# Patient Record
Sex: FEMALE | Race: WHITE | NOT HISPANIC OR LATINO | Employment: FULL TIME | URBAN - METROPOLITAN AREA
[De-identification: names, ages, dates, MRNs, and addresses within clinical notes are randomized per-mention and may not be internally consistent; named-entity substitution may affect disease eponyms.]

---

## 2019-01-14 ENCOUNTER — OFFICE VISIT CONVERTED (OUTPATIENT)
Dept: SURGERY | Facility: CLINIC | Age: 49
End: 2019-01-14
Attending: SURGERY

## 2019-01-25 ENCOUNTER — HOSPITAL ENCOUNTER (OUTPATIENT)
Dept: GASTROENTEROLOGY | Facility: HOSPITAL | Age: 49
Setting detail: HOSPITAL OUTPATIENT SURGERY
Discharge: HOME | End: 2019-01-25
Attending: SURGERY

## 2019-02-19 ENCOUNTER — OFFICE VISIT CONVERTED (OUTPATIENT)
Dept: SURGERY | Facility: CLINIC | Age: 49
End: 2019-02-19
Attending: SURGERY

## 2019-03-04 ENCOUNTER — OFFICE VISIT CONVERTED (OUTPATIENT)
Dept: ORTHOPEDIC SURGERY | Facility: CLINIC | Age: 49
End: 2019-03-04
Attending: ORTHOPAEDIC SURGERY

## 2019-03-13 ENCOUNTER — OFFICE VISIT CONVERTED (OUTPATIENT)
Dept: ORTHOPEDIC SURGERY | Facility: CLINIC | Age: 49
End: 2019-03-13
Attending: ORTHOPAEDIC SURGERY

## 2019-03-13 ENCOUNTER — CONVERSION ENCOUNTER (OUTPATIENT)
Dept: ORTHOPEDIC SURGERY | Facility: CLINIC | Age: 49
End: 2019-03-13

## 2019-08-01 ENCOUNTER — HOSPITAL ENCOUNTER (OUTPATIENT)
Dept: PERIOP | Facility: HOSPITAL | Age: 49
Setting detail: HOSPITAL OUTPATIENT SURGERY
Discharge: HOME | End: 2019-08-01
Attending: ORTHOPAEDIC SURGERY

## 2019-08-14 ENCOUNTER — OFFICE VISIT CONVERTED (OUTPATIENT)
Dept: ORTHOPEDIC SURGERY | Facility: CLINIC | Age: 49
End: 2019-08-14
Attending: PHYSICIAN ASSISTANT

## 2019-09-18 ENCOUNTER — OFFICE VISIT CONVERTED (OUTPATIENT)
Dept: ORTHOPEDIC SURGERY | Facility: CLINIC | Age: 49
End: 2019-09-18
Attending: PHYSICIAN ASSISTANT

## 2021-03-30 ENCOUNTER — OFFICE VISIT CONVERTED (OUTPATIENT)
Dept: FAMILY MEDICINE CLINIC | Facility: CLINIC | Age: 51
End: 2021-03-30
Attending: PHYSICIAN ASSISTANT

## 2021-03-30 ENCOUNTER — CONVERSION ENCOUNTER (OUTPATIENT)
Dept: FAMILY MEDICINE CLINIC | Facility: CLINIC | Age: 51
End: 2021-03-30

## 2021-05-10 NOTE — H&P
History and Physical      Patient Name: Idalia Drew   Patient ID: [id number]   Sex: Female   YOB: 1970    Primary Care Provider: Sarkis Pete PA-C   Referring Provider: Connor Jara MD    Visit Date: March 30, 2021    Provider: Sarkis Pete PA-C   Location: [facility] [place]   Location Address: [address] [place] [address], [address]  [place], [place]  [id number]   Location Phone: [phone]          Chief Complaint  · New Pt/Establish Care      History Of Present Illness  Idalia Drew is a 50 year old /White female who presents for evaluation and treatment of: New Pt/Establish Care      Pt presents today to establish care. Pt's previous PCP was Dr. Ronn Jara.     Pt is requesting refills on medications, she is on Adderall, Klonopin and Neurontin.     PMH- Breast Cancer- sees Dr. Noble once a year. ADD- takes Adderall 10mg 1/2 tab bid. Anxiety-controlled with Klonopin, pt is unsure of what dose she takes. Chemo induced neuropathy- controlled with Neurontin.     Pt has received both doses of COVID-19 vaccine.     Labs-2019  Pap-Dr. Hamilton 2019  Flu- refused  Colonoscopy-2019 Dr. Cuevas    Pt had hx of breast cancer - Stage IIIB - Triple Negative  2008 - [facility] - Dr. Ybarra  Right breast - 10 lymph node positive         Past Medical History  Disease Name Date Onset Notes   Anxiety --  --    Arthritis --  --    Asthma --  --    Bladder disorder --  --    Breast cancer --  --    Broken Bones --  --    Cancer --  --    Chemotherapy management, encounter for --  --    Diverticulitis 2019 --    Forgetfulness --  --    Gall Stones --  --    GERD --  --    Heart Murmur --  --    Limb Pain --  --    Limb Swelling --  --    Night sweat --  --    Psoriasis --  --    Reflux --  --    Reflux Disease --  --    Seasonal allergies --  --    Shortness of Breath --  --          Past Surgical History  Procedure Name Date Notes   Breast --  --    Cesarian Section 1989, 1993 --     Colonoscopy --  --    Gallbladder --  --    Mastectomy, bilateral 2008 --    Port Placement --  --          Medication List  Name Date Started Instructions   Adderall oral  take by oral route daily   Klonopin oral  take by oral route As needed   Neurontin 600 mg oral tablet  take 1 tablet (600 mg) by oral route 3 times per day         Allergy List  Allergen Name Date Reaction Notes   Bactrim --  --  --    Latex Exam Gloves --  --  --    SULFA (SULFONAMIDES) --  --  --          Family Medical History  Disease Name Relative/Age Notes   Stroke Mother/   Mother   Alzheimer's Disease Father/   --    Heart Disease Father/   --    Lymphoma, Malignant Father/   --    Lung cancer Aunt/   --    Family history of breast cancer Aunt/   Aunt/30s   Diabetes  --    Osteoporosis Mother/  Sister/   Mother; Sister   Family history of Arthritis Father/  Mother/  Sister/   Mother; Father; Sister         Social History  Finding Status Start/Stop Quantity Notes   Alcohol Current some day --/-- --  drinks weekly; wine. Pt reports 10 glasses of wine per week.    Caffeine Current every day --/-- --  drinks regularly; coffee; 3-4 times per day   lives with spouse --  --/-- --  --    . --  --/-- --  --    Recreational Drug Use Never --/-- --  no   Tobacco Former --/-- --  former smoker  former smoker; started smoking at age 16; quit smoking at age 23; smoked 11 cigarette(s) per day   Working --  --/-- --  --          Review of Systems  · Constitutional  o Denies  o : fever, fatigue, weight loss, weight gain  · Cardiovascular  o Denies  o : lower extremity edema, claudication, chest pressure, palpitations  · Respiratory  o Denies  o : shortness of breath, wheezing, cough, hemoptysis, dyspnea on exertion  · Gastrointestinal  o Denies  o : nausea, vomiting, diarrhea, constipation, abdominal pain      Vitals  Date Time BP Position Site L\R Cuff Size HR RR TEMP (F) WT  HT  BMI kg/m2 BSA m2 O2 Sat FR L/min FiO2 HC       03/30/2021 10:10  "/62 Sitting    66 - R   156lbs 12.8oz 5'  4\" 26.91 1.79 98 %            Physical Examination  · Constitutional  o Appearance  o : well developed, well-nourished, no acute distress  · Head and Face  o Head  o : normocephalic, atraumatic  · Ears, Nose, Mouth and Throat  o Ears  o :   § External Ears  § : external auditory canal appearance normal, no discharge present  § Otoscopic Examination  § : tympanic membranes pearly white/gray bilaterally  o Nose  o :   § External Nose  § : no lesions noted  § Nasopharynx  § : no discharge present  o Oral Cavity  o :   § Oral Mucosa  § : oral mucosa light pink  o Throat  o :   § Oropharynx  § : tonsils without exudate, no palatal petechiae  · Neck  o Inspection/Palpation  o : normal appearance, no masses or tenderness, trachea midline  o Thyroid  o : gland size normal, nontender, no nodules or masses present on palpation  · Respiratory  o Respiratory Effort  o : breathing unlabored  o Inspection of Chest  o : chest rise symmetric bilaterally  o Auscultation of Lungs  o : clear to auscultation bilaterally throughout inspiration and expiration  · Cardiovascular  o Heart  o :   § Auscultation of Heart  § : regular rate and rhythm, no murmurs, gallops or rubs  o Peripheral Vascular System  o :   § Extremities  § : no edema  · Lymphatic  o Neck  o : no cervical lymphadenopathy, no supraclavicular lymphadenopathy  · Psychiatric  o Mood and Affect  o : mood normal, affect appropriate          Assessment  · Anxiety disorder     300.00/F41.9  · Screening for depression     V79.0/Z13.89  · Need for influenza vaccination     V04.81/Z23  · GERD     530.81  · Breast cancer     174.9/C50.919  Triple Negative Stage IIIB  · Neuropathy     355.9/G62.9  · ADD (attention deficit disorder)     314.00/F98.8  · Neuropathy associated with cancer       Malignant (primary) neoplasm, unspecified     199.1/C80.1  Polyneuropathy in diseases classified " elsewhere     199.1/G63      Plan  · Orders  o ACO-18: Positive screen for clinical depression using a standardized tool and a follow-up plan documented () - V79.0/Z13.89 - 03/30/2021  o ACO-14: Influenza immunization was not administered for reasons documented () - V04.81/Z23 - 03/30/2021   pt refused  o Free T4 (59571) - - 03/30/2021  o Physical, Primary Care Panel (CBC, CMP, Lipid, TSH) [facility] (42346, 17896, 75455, 87424) - - 03/30/2021  o Urinalysis with Reflex Microscopy ([facility]) (05963) - - 03/30/2021  o Vitamin D (25-Hydroxy) Level (11178) - - 03/30/2021  o ORTIZ Report ([facility]) - - 03/30/2021  o ACO-39: Current medications updated and reviewed (1159F, ) - - 03/30/2021  o Urine Drug Screen ([facility]) (85684) - - 03/30/2021  · Medications  o Strattera 40 mg oral capsule   SIG: take 2 capsules (80 mg) by oral route once daily in the morning   DISP: (60) Capsule with 2 refills  Prescribed on 03/30/2021     o Neurontin 600 mg oral tablet   SIG: take 1 tablet (600 mg) by oral route 3 times per day for 30 days   DISP: (90) Tablet with 5 refills  Adjusted on 03/30/2021     o Adderall oral   SIG: take by oral route daily   DISP: (0) Tablet with 0 refills  Discontinued on 03/30/2021     o Medications have been Reconciled  o Transition of Care or Provider Policy  · Instructions  o Depression Screen completed and scanned into the EMR under the designated folder within the patient's documents.  o Today's PHQ-9 result is _17__  o Flu vaccine declined.  o Obtained a written consent for ORTIZ query. Discussed the risk and benefits of the use of controlled substances with the patient, including the risk of tolerance and drug dependence. The patient has been counseled on the need to have an exit strategy, including potentially discontinuing the use of controlled substances. ORTIZ has or will be reviewed as soon as it becomes avaliable.  o See note for indication of controlled substance. [place]iz and drug screen have been  reviewed. Controlled substance agreement signed and scanned into chart. After discussion of risks and benefits of medication, I have determined patient is suitable for Rx while demonstrating the ability to safely follow and administer the medication plan. Patient understands the expectation that medication directions cannot be adjusted without a provider's written approval.   o Take all medications as prescribed/directed.  o Patient instructed/educated on their diet and exercise program.  o Patient was educated/instructed on their diagnosis, treatment and medications prior to discharge from the clinic today.  o Flu vaccine declined.  o Discussed Covid-19 precautions including, but not limited to, social distancing, avoid touching your face, and hand washing.   · Disposition  o Call or Return if symptoms worsen or persist.  o F/U in clinic in 1 month.  o Care Transition            Electronically Signed by: Sarkis Pete PA-C -Author on March 31, 2021 04:43:07 PM

## 2021-05-14 VITALS
BODY MASS INDEX: 26.76 KG/M2 | DIASTOLIC BLOOD PRESSURE: 62 MMHG | WEIGHT: 156.75 LBS | HEART RATE: 66 BPM | HEIGHT: 64 IN | OXYGEN SATURATION: 98 % | SYSTOLIC BLOOD PRESSURE: 126 MMHG

## 2021-05-15 VITALS — BODY MASS INDEX: 28.04 KG/M2 | OXYGEN SATURATION: 99 % | HEART RATE: 80 BPM | HEIGHT: 64 IN | WEIGHT: 164.25 LBS

## 2021-05-15 VITALS — HEART RATE: 75 BPM | HEIGHT: 64 IN | BODY MASS INDEX: 27.38 KG/M2 | OXYGEN SATURATION: 96 % | WEIGHT: 160.37 LBS

## 2021-05-16 VITALS — HEIGHT: 64 IN | WEIGHT: 169 LBS | BODY MASS INDEX: 28.85 KG/M2

## 2021-05-16 VITALS — HEIGHT: 64 IN | WEIGHT: 169 LBS | OXYGEN SATURATION: 98 % | BODY MASS INDEX: 28.85 KG/M2 | HEART RATE: 76 BPM

## 2021-05-16 VITALS — WEIGHT: 175.31 LBS | HEIGHT: 64 IN | RESPIRATION RATE: 14 BRPM | BODY MASS INDEX: 29.93 KG/M2

## 2021-05-16 VITALS — RESPIRATION RATE: 14 BRPM | WEIGHT: 175 LBS | HEIGHT: 64 IN | BODY MASS INDEX: 29.88 KG/M2

## 2021-07-02 ENCOUNTER — OFFICE VISIT (OUTPATIENT)
Dept: FAMILY MEDICINE CLINIC | Facility: CLINIC | Age: 51
End: 2021-07-02

## 2021-07-02 VITALS
HEART RATE: 69 BPM | BODY MASS INDEX: 28.35 KG/M2 | OXYGEN SATURATION: 99 % | DIASTOLIC BLOOD PRESSURE: 66 MMHG | HEIGHT: 63 IN | WEIGHT: 160 LBS | SYSTOLIC BLOOD PRESSURE: 120 MMHG

## 2021-07-02 DIAGNOSIS — Z51.11 CHEMOTHERAPY MANAGEMENT, ENCOUNTER FOR: ICD-10-CM

## 2021-07-02 DIAGNOSIS — F32.A DEPRESSION, UNSPECIFIED DEPRESSION TYPE: ICD-10-CM

## 2021-07-02 DIAGNOSIS — C50.919 MALIGNANT NEOPLASM OF FEMALE BREAST, UNSPECIFIED ESTROGEN RECEPTOR STATUS, UNSPECIFIED LATERALITY, UNSPECIFIED SITE OF BREAST (HCC): Primary | ICD-10-CM

## 2021-07-02 DIAGNOSIS — G62.9 NEUROPATHY: ICD-10-CM

## 2021-07-02 DIAGNOSIS — F41.9 ANXIETY DISORDER, UNSPECIFIED TYPE: ICD-10-CM

## 2021-07-02 PROBLEM — R01.1 HEART MURMUR: Status: ACTIVE | Noted: 2021-07-02

## 2021-07-02 PROBLEM — M79.89 LIMB SWELLING: Status: ACTIVE | Noted: 2021-07-02

## 2021-07-02 PROBLEM — L40.9 PSORIASIS: Status: ACTIVE | Noted: 2021-07-02

## 2021-07-02 PROBLEM — K57.92 DIVERTICULITIS: Status: ACTIVE | Noted: 2021-07-02

## 2021-07-02 PROBLEM — J30.2 SEASONAL ALLERGIC RHINITIS: Status: ACTIVE | Noted: 2021-07-02

## 2021-07-02 PROBLEM — K21.9 ESOPHAGEAL REFLUX: Status: ACTIVE | Noted: 2021-07-02

## 2021-07-02 PROBLEM — J45.909 ASTHMA: Status: ACTIVE | Noted: 2021-07-02

## 2021-07-02 PROCEDURE — 99214 OFFICE O/P EST MOD 30 MIN: CPT | Performed by: PHYSICIAN ASSISTANT

## 2021-07-02 PROCEDURE — 80305 DRUG TEST PRSMV DIR OPT OBS: CPT | Performed by: PHYSICIAN ASSISTANT

## 2021-07-02 RX ORDER — GABAPENTIN 600 MG/1
600 TABLET ORAL 3 TIMES DAILY
COMMUNITY
End: 2021-07-02 | Stop reason: SDUPTHER

## 2021-07-02 RX ORDER — CLONAZEPAM 0.5 MG/1
TABLET ORAL
COMMUNITY
Start: 2021-03-31 | End: 2021-07-02 | Stop reason: SDUPTHER

## 2021-07-02 RX ORDER — CLONAZEPAM 0.5 MG/1
0.5 TABLET ORAL 2 TIMES DAILY PRN
Qty: 60 TABLET | Refills: 0 | Status: SHIPPED | OUTPATIENT
Start: 2021-07-02 | End: 2021-08-05

## 2021-07-02 RX ORDER — FLUOXETINE HYDROCHLORIDE 20 MG/1
20 CAPSULE ORAL DAILY
Qty: 30 CAPSULE | Refills: 2 | Status: SHIPPED | OUTPATIENT
Start: 2021-07-02 | End: 2021-10-18

## 2021-07-02 RX ORDER — ATOMOXETINE 80 MG/1
CAPSULE ORAL
COMMUNITY
Start: 2021-03-31 | End: 2021-07-02

## 2021-07-02 RX ORDER — GABAPENTIN 600 MG/1
600 TABLET ORAL 3 TIMES DAILY
Qty: 90 TABLET | Refills: 2 | Status: SHIPPED | OUTPATIENT
Start: 2021-07-02 | End: 2021-10-18 | Stop reason: SDUPTHER

## 2021-07-02 NOTE — PROGRESS NOTES
"Chief Complaint  Anxiety (3 month follow up) and Heartburn    Subjective          Idalia Drew presents to [facility]  History of Present Illness  Pt presents today for 3 month follow up.    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  Survivors guilt - she is crying during exam today.  She did not have labs done that were ordered.    She lost her nephew and her brother from MVA and cancer respectively.  No SI/HI    Objective   Vital Signs:   /66 (BP Location: Left arm)   Pulse 69   Ht 160 cm (63\")   Wt 72.6 kg (160 lb)   SpO2 99%   BMI 28.34 kg/m²     Physical Exam  Vitals and nursing note reviewed.   Constitutional:       Appearance: Normal appearance.   HENT:      Head: Normocephalic and atraumatic.   Cardiovascular:      Rate and Rhythm: Normal rate and regular rhythm.   Pulmonary:      Effort: Pulmonary effort is normal.      Breath sounds: Normal breath sounds.   Musculoskeletal:      Cervical back: Neck supple.   Neurological:      Mental Status: She is alert.   Psychiatric:         Mood and Affect: Mood normal.         Behavior: Behavior normal.      Crying during exam    Result Review :                       Assessment and Plan    Diagnoses and all orders for this visit:    1. Malignant neoplasm of female breast, unspecified estrogen receptor status, unspecified laterality, unspecified site of breast (CMS/HCC) (Primary)  -     CBC & Differential; Future  -     Comprehensive Metabolic Panel; Future  -     Lipid Panel; Future  -     TSH Rfx On Abnormal To Free T4; Future  -     Vitamin D 25 Hydroxy; Future  -     Urinalysis With Microscopic If Indicated (No Culture) - Urine, Clean Catch; Future  -     Vitamin B12 & Folate; Future    2. Neuropathy  -     CBC & " Differential; Future  -     Comprehensive Metabolic Panel; Future  -     Lipid Panel; Future  -     TSH Rfx On Abnormal To Free T4; Future  -     Vitamin D 25 Hydroxy; Future  -     Urinalysis With Microscopic If Indicated (No Culture) - Urine, Clean Catch; Future  -     Vitamin B12 & Folate; Future  -     gabapentin (NEURONTIN) 600 MG tablet; Take 1 tablet by mouth 3 (Three) Times a Day.  Dispense: 90 tablet; Refill: 2    3. Chemotherapy management, encounter for  -     CBC & Differential; Future  -     Comprehensive Metabolic Panel; Future  -     Lipid Panel; Future  -     TSH Rfx On Abnormal To Free T4; Future  -     Vitamin D 25 Hydroxy; Future  -     Urinalysis With Microscopic If Indicated (No Culture) - Urine, Clean Catch; Future  -     Vitamin B12 & Folate; Future  -     gabapentin (NEURONTIN) 600 MG tablet; Take 1 tablet by mouth 3 (Three) Times a Day.  Dispense: 90 tablet; Refill: 2    4. Anxiety disorder, unspecified type  -     FLUoxetine (PROzac) 20 MG capsule; Take 1 capsule by mouth Daily.  Dispense: 30 capsule; Refill: 2  -     clonazePAM (KlonoPIN) 0.5 MG tablet; Take 1 tablet by mouth 2 (Two) Times a Day As Needed for Anxiety.  Dispense: 60 tablet; Refill: 0    5. Depression, unspecified depression type  -     FLUoxetine (PROzac) 20 MG capsule; Take 1 capsule by mouth Daily.  Dispense: 30 capsule; Refill: 2  -     clonazePAM (KlonoPIN) 0.5 MG tablet; Take 1 tablet by mouth 2 (Two) Times a Day As Needed for Anxiety.  Dispense: 60 tablet; Refill: 0       Pt is willing to try SSRI.    Follow Up   Return in about 3 months (around 10/2/2021).  Patient was given instructions and counseling regarding her condition or for health maintenance advice. Please see specific information pulled into the AVS if appropriate.     SHEREE Galvin

## 2021-08-05 DIAGNOSIS — F32.A DEPRESSION, UNSPECIFIED DEPRESSION TYPE: ICD-10-CM

## 2021-08-05 DIAGNOSIS — F41.9 ANXIETY DISORDER, UNSPECIFIED TYPE: ICD-10-CM

## 2021-08-05 RX ORDER — CLONAZEPAM 0.5 MG/1
TABLET ORAL
Qty: 60 TABLET | Refills: 0 | Status: SHIPPED | OUTPATIENT
Start: 2021-08-05 | End: 2021-10-18 | Stop reason: SDUPTHER

## 2021-10-18 ENCOUNTER — OFFICE VISIT (OUTPATIENT)
Dept: FAMILY MEDICINE CLINIC | Facility: CLINIC | Age: 51
End: 2021-10-18

## 2021-10-18 VITALS
SYSTOLIC BLOOD PRESSURE: 120 MMHG | HEART RATE: 66 BPM | WEIGHT: 167 LBS | DIASTOLIC BLOOD PRESSURE: 63 MMHG | HEIGHT: 63 IN | OXYGEN SATURATION: 98 % | BODY MASS INDEX: 29.59 KG/M2

## 2021-10-18 DIAGNOSIS — Z51.11 CHEMOTHERAPY MANAGEMENT, ENCOUNTER FOR: ICD-10-CM

## 2021-10-18 DIAGNOSIS — F32.A DEPRESSION, UNSPECIFIED DEPRESSION TYPE: ICD-10-CM

## 2021-10-18 DIAGNOSIS — G62.9 NEUROPATHY: Chronic | ICD-10-CM

## 2021-10-18 DIAGNOSIS — E53.8 VITAMIN B12 DEFICIENCY: Chronic | ICD-10-CM

## 2021-10-18 DIAGNOSIS — F41.9 ANXIETY DISORDER, UNSPECIFIED TYPE: Primary | ICD-10-CM

## 2021-10-18 PROCEDURE — 99214 OFFICE O/P EST MOD 30 MIN: CPT | Performed by: PHYSICIAN ASSISTANT

## 2021-10-18 RX ORDER — CYANOCOBALAMIN 1000 UG/ML
1000 INJECTION, SOLUTION INTRAMUSCULAR; SUBCUTANEOUS
Qty: 3 ML | Refills: 3 | Status: SHIPPED | OUTPATIENT
Start: 2021-10-18 | End: 2022-06-03 | Stop reason: SDUPTHER

## 2021-10-18 RX ORDER — CLONAZEPAM 0.5 MG/1
0.5 TABLET ORAL 2 TIMES DAILY PRN
Qty: 60 TABLET | Refills: 0 | Status: SHIPPED | OUTPATIENT
Start: 2021-10-18 | End: 2022-01-25 | Stop reason: SDUPTHER

## 2021-10-18 RX ORDER — VORTIOXETINE 10 MG/1
10 TABLET, FILM COATED ORAL DAILY
Qty: 90 TABLET | Refills: 1 | Status: SHIPPED | OUTPATIENT
Start: 2021-10-18 | End: 2022-01-25

## 2021-10-18 RX ORDER — GABAPENTIN 600 MG/1
600 TABLET ORAL 3 TIMES DAILY
Qty: 90 TABLET | Refills: 3 | Status: SHIPPED | OUTPATIENT
Start: 2021-10-18 | End: 2022-01-25 | Stop reason: SDUPTHER

## 2021-10-18 RX ORDER — CYANOCOBALAMIN 1000 UG/ML
1000 INJECTION, SOLUTION INTRAMUSCULAR; SUBCUTANEOUS
Status: SHIPPED | OUTPATIENT
Start: 2021-10-18

## 2021-10-18 NOTE — PROGRESS NOTES
"Chief Complaint  Anxiety (3 month follow up) and Depression    Subjective          Idalia Drew presents to [facility]  History of Present Illness    Idalia Drew is a 51 y.o. female who presents today for a 3 month follow up Anxiety, depression.     Pt notes she is doing well on Prozac, however she has gained 7lbs in 3 months. Pt is concerned about weight gain and stated it has made it hard on her knees.     Labs-5/2020  Colonoscopy-1/2019  UDS-7/2021  Farzad-7/2/21    Pt refuses flu vaccine.   Pt refuses pneumo shot      Current Outpatient Medications:   •  clonazePAM (KlonoPIN) 0.5 MG tablet, Take 1 tablet by mouth 2 (Two) Times a Day As Needed for Anxiety., Disp: 60 tablet, Rfl: 0  •  gabapentin (NEURONTIN) 600 MG tablet, Take 1 tablet by mouth 3 (Three) Times a Day., Disp: 90 tablet, Rfl: 3  •  cyanocobalamin 1000 MCG/ML injection, Inject 1 mL into the appropriate muscle as directed by prescriber Every 28 (Twenty-Eight) Days., Disp: 3 mL, Rfl: 3  •  Vortioxetine HBr (Trintellix) 10 MG tablet, Take 10 mg by mouth Daily., Disp: 90 tablet, Rfl: 1    Current Facility-Administered Medications:   •  cyanocobalamin injection 1,000 mcg, 1,000 mcg, Intramuscular, Q28 Days, Ivan Pete PA    Objective      Vital Signs:   /63 (BP Location: Left arm)   Pulse 66   Ht 160 cm (63\")   Wt 75.8 kg (167 lb)   SpO2 98%   BMI 29.58 kg/m²    Estimated body mass index is 29.58 kg/m² as calculated from the following:    Height as of this encounter: 160 cm (63\").    Weight as of this encounter: 75.8 kg (167 lb).     Physical Exam  Vitals and nursing note reviewed.   Constitutional:       Appearance: Normal appearance.   HENT:      Head: Normocephalic and atraumatic.   Cardiovascular:      Rate and Rhythm: Normal rate and regular rhythm.      Heart sounds: Murmur heard.       Pulmonary:      Effort: Pulmonary effort is normal.      Breath sounds: Normal breath sounds.   Musculoskeletal: "      Cervical back: Neck supple.   Neurological:      Mental Status: She is alert.   Psychiatric:         Mood and Affect: Mood normal.         Behavior: Behavior normal.        Result Review :                       Assessment and Plan      Diagnoses and all orders for this visit:    1. Anxiety disorder, unspecified type (Primary)  -     Vortioxetine HBr (Trintellix) 10 MG tablet; Take 10 mg by mouth Daily.  Dispense: 90 tablet; Refill: 1  -     clonazePAM (KlonoPIN) 0.5 MG tablet; Take 1 tablet by mouth 2 (Two) Times a Day As Needed for Anxiety.  Dispense: 60 tablet; Refill: 0    2. Depression, unspecified depression type  -     Vortioxetine HBr (Trintellix) 10 MG tablet; Take 10 mg by mouth Daily.  Dispense: 90 tablet; Refill: 1  -     clonazePAM (KlonoPIN) 0.5 MG tablet; Take 1 tablet by mouth 2 (Two) Times a Day As Needed for Anxiety.  Dispense: 60 tablet; Refill: 0    3. Neuropathy  Comments:  Stable on neurontin 600mg TID  Orders:  -     gabapentin (NEURONTIN) 600 MG tablet; Take 1 tablet by mouth 3 (Three) Times a Day.  Dispense: 90 tablet; Refill: 3    4. Chemotherapy management, encounter for  -     gabapentin (NEURONTIN) 600 MG tablet; Take 1 tablet by mouth 3 (Three) Times a Day.  Dispense: 90 tablet; Refill: 3    5. Vitamin B12 deficiency  Comments:  Stable on B12 injections monthly  Orders:  -     cyanocobalamin injection 1,000 mcg  -     cyanocobalamin 1000 MCG/ML injection; Inject 1 mL into the appropriate muscle as directed by prescriber Every 28 (Twenty-Eight) Days.  Dispense: 3 mL; Refill: 3       Follow Up     Return in about 4 months (around 2/18/2022).    I have reviewed information obtained and documented by others and I have confirmed the accuracy of this documented note.     Patient was given instructions and counseling regarding her condition or for health maintenance advice. Please see specific information pulled into the AVS if appropriate.     SHEREE Galvin

## 2021-11-07 DIAGNOSIS — F41.9 ANXIETY DISORDER, UNSPECIFIED TYPE: Primary | ICD-10-CM

## 2021-11-08 RX ORDER — FLUOXETINE HYDROCHLORIDE 20 MG/1
CAPSULE ORAL
Qty: 30 CAPSULE | Refills: 3 | Status: SHIPPED | OUTPATIENT
Start: 2021-11-08 | End: 2022-01-25

## 2021-11-12 ENCOUNTER — TELEPHONE (OUTPATIENT)
Dept: FAMILY MEDICINE CLINIC | Facility: CLINIC | Age: 51
End: 2021-11-12

## 2022-01-25 ENCOUNTER — OFFICE VISIT (OUTPATIENT)
Dept: FAMILY MEDICINE CLINIC | Facility: CLINIC | Age: 52
End: 2022-01-25

## 2022-01-25 VITALS
HEIGHT: 63 IN | HEART RATE: 73 BPM | SYSTOLIC BLOOD PRESSURE: 133 MMHG | WEIGHT: 173 LBS | DIASTOLIC BLOOD PRESSURE: 71 MMHG | BODY MASS INDEX: 30.65 KG/M2 | OXYGEN SATURATION: 98 %

## 2022-01-25 DIAGNOSIS — E66.09 CLASS 1 OBESITY DUE TO EXCESS CALORIES WITH SERIOUS COMORBIDITY AND BODY MASS INDEX (BMI) OF 30.0 TO 30.9 IN ADULT: Chronic | ICD-10-CM

## 2022-01-25 DIAGNOSIS — C50.919 MALIGNANT NEOPLASM OF FEMALE BREAST, UNSPECIFIED ESTROGEN RECEPTOR STATUS, UNSPECIFIED LATERALITY, UNSPECIFIED SITE OF BREAST: ICD-10-CM

## 2022-01-25 DIAGNOSIS — G62.9 NEUROPATHY: Chronic | ICD-10-CM

## 2022-01-25 DIAGNOSIS — F32.A DEPRESSION, UNSPECIFIED DEPRESSION TYPE: ICD-10-CM

## 2022-01-25 DIAGNOSIS — F41.9 ANXIETY DISORDER, UNSPECIFIED TYPE: Chronic | ICD-10-CM

## 2022-01-25 DIAGNOSIS — Z51.11 CHEMOTHERAPY MANAGEMENT, ENCOUNTER FOR: ICD-10-CM

## 2022-01-25 DIAGNOSIS — E53.8 VITAMIN B12 DEFICIENCY: Primary | ICD-10-CM

## 2022-01-25 DIAGNOSIS — K21.00 GASTROESOPHAGEAL REFLUX DISEASE WITH ESOPHAGITIS WITHOUT HEMORRHAGE: ICD-10-CM

## 2022-01-25 DIAGNOSIS — E55.9 VITAMIN D DEFICIENCY: ICD-10-CM

## 2022-01-25 DIAGNOSIS — R41.840 ATTENTION DEFICIT: ICD-10-CM

## 2022-01-25 PROCEDURE — 99214 OFFICE O/P EST MOD 30 MIN: CPT | Performed by: PHYSICIAN ASSISTANT

## 2022-01-25 RX ORDER — CLONAZEPAM 0.5 MG/1
0.5 TABLET ORAL 2 TIMES DAILY PRN
Qty: 60 TABLET | Refills: 0 | Status: SHIPPED | OUTPATIENT
Start: 2022-01-25 | End: 2022-06-03 | Stop reason: SDUPTHER

## 2022-01-25 RX ORDER — VORTIOXETINE 10 MG/1
10 TABLET, FILM COATED ORAL DAILY
Qty: 90 TABLET | Refills: 1 | Status: SHIPPED | OUTPATIENT
Start: 2022-01-25 | End: 2022-03-04

## 2022-01-25 RX ORDER — VORTIOXETINE 10 MG/1
10 TABLET, FILM COATED ORAL DAILY
Qty: 90 TABLET | Refills: 1 | Status: SHIPPED | OUTPATIENT
Start: 2022-01-25 | End: 2022-01-25 | Stop reason: SDUPTHER

## 2022-01-25 RX ORDER — GABAPENTIN 600 MG/1
600 TABLET ORAL 3 TIMES DAILY
Qty: 90 TABLET | Refills: 3 | Status: SHIPPED | OUTPATIENT
Start: 2022-01-25 | End: 2022-06-17

## 2022-01-25 NOTE — PROGRESS NOTES
Chief Complaint  Anxiety (3 month follow up) and Depression    Subjective          Idalia Drew presents to [facility]  History of Present Illness  Pt presents today for 3 month follow up.    Pt states she would like to discuss something for ADD. Pt was previously on Adderall for over a year and did well on medication.  Pt was taken off when  retired.    Pt states she would like to discuss her weight gain. Pt states the weight is hard on her legs. Pt states the weight gain started after stopping the adderall.    Pt states she was covid positive on De 10. Pt states she still having fatigue.    Labs pending  [facility] 21  uds 2021    No CP, SOA, HA    Breast Cancer - PMH    Disc at length that pt will need psych for meds.  She refuses.  I will not manage this aspect of her care.  She is welcome to see a different PCP.    Past Medical History:   Diagnosis Date   • Acid reflux    • Anxiety    • Anxiety disorder 2021   • Arthritis    • Asthma    • Bladder disorder    • Breast cancer (HCC)    • Broken bones    • Cancer (HCC)    • Chemotherapy management, encounter for    • Diverticulitis    • Forgetfulness    • Gall stones    • GERD (gastroesophageal reflux disease)    • Heart murmur    • Limb pain    • Limb swelling    • Neuropathy associated with cancer (HCC) 2021   • Night sweats    • Psoriasis    • Seasonal allergies    • Shortness of breath       Family History   Problem Relation Age of Onset   • Stroke Mother    • Osteoporosis Mother    • Arthritis Mother    • Alzheimer's disease Father    • Heart disease Father    • Lymphoma Father    • Arthritis Father    • Osteoporosis Sister    • Arthritis Sister    • Lung cancer Other    • Breast cancer Other         30s   • Diabetes Other       Past Surgical History:   Procedure Laterality Date   • BREAST SURGERY     •  SECTION      1993   • COLONOSCOPY     • GALLBLADDER SURGERY     • MASTECTOMY  "Bilateral 2008   • PORTACATH PLACEMENT          Current Outpatient Medications:   •  clonazePAM (KlonoPIN) 0.5 MG tablet, Take 1 tablet by mouth 2 (Two) Times a Day As Needed for Anxiety., Disp: 60 tablet, Rfl: 0  •  cyanocobalamin 1000 MCG/ML injection, Inject 1 mL into the appropriate muscle as directed by prescriber Every 28 (Twenty-Eight) Days., Disp: 3 mL, Rfl: 3  •  gabapentin (NEURONTIN) 600 MG tablet, Take 1 tablet by mouth 3 (Three) Times a Day., Disp: 90 tablet, Rfl: 3  •  Vortioxetine HBr (Trintellix) 10 MG tablet, Take 10 mg by mouth Daily., Disp: 90 tablet, Rfl: 1    Current Facility-Administered Medications:   •  cyanocobalamin injection 1,000 mcg, 1,000 mcg, Intramuscular, Q28 Days, Ivan Pete PA    Objective     Vital Signs:     /71 (BP Location: Left arm)   Pulse 73   Ht 160 cm (63\")   Wt 78.5 kg (173 lb)   SpO2 98%   BMI 30.65 kg/m²    Estimated body mass index is 30.65 kg/m² as calculated from the following:    Height as of this encounter: 160 cm (63\").    Weight as of this encounter: 78.5 kg (173 lb).     Wt Readings from Last 3 Encounters:   01/25/22 78.5 kg (173 lb)   10/18/21 75.8 kg (167 lb)   07/02/21 72.6 kg (160 lb)     BP Readings from Last 3 Encounters:   01/25/22 133/71   10/18/21 120/63   07/02/21 120/66     Physical Exam  Vitals and nursing note reviewed.   Constitutional:       Appearance: Normal appearance.   HENT:      Head: Normocephalic and atraumatic.   Cardiovascular:      Rate and Rhythm: Normal rate and regular rhythm.   Pulmonary:      Effort: Pulmonary effort is normal.      Breath sounds: Normal breath sounds.   Musculoskeletal:      Cervical back: Neck supple.   Neurological:      Mental Status: She is alert.   Psychiatric:         Mood and Affect: Mood normal.         Behavior: Behavior normal.       Result Review :                       Assessment and Plan      Diagnoses and all orders for this visit:    1. Vitamin B12 deficiency (Primary)  -     Vitamin " B12 & Folate; Future    2. Anxiety disorder, unspecified type  Comments:  Stable on PRN klonopin 0.5mg   Orders:  -     clonazePAM (KlonoPIN) 0.5 MG tablet; Take 1 tablet by mouth 2 (Two) Times a Day As Needed for Anxiety.  Dispense: 60 tablet; Refill: 0  -     Discontinue: Vortioxetine HBr (Trintellix) 10 MG tablet; Take 10 mg by mouth Daily.  Dispense: 90 tablet; Refill: 1  -     TSH Rfx On Abnormal To Free T4; Future  -     Urinalysis With Microscopic If Indicated (No Culture) - Urine, Clean Catch; Future  -     Vortioxetine HBr (Trintellix) 10 MG tablet; Take 10 mg by mouth Daily.  Dispense: 90 tablet; Refill: 1  -     Ambulatory Referral to Psychiatry    3. Depression, unspecified depression type  -     clonazePAM (KlonoPIN) 0.5 MG tablet; Take 1 tablet by mouth 2 (Two) Times a Day As Needed for Anxiety.  Dispense: 60 tablet; Refill: 0  -     Discontinue: Vortioxetine HBr (Trintellix) 10 MG tablet; Take 10 mg by mouth Daily.  Dispense: 90 tablet; Refill: 1  -     TSH Rfx On Abnormal To Free T4; Future  -     Urinalysis With Microscopic If Indicated (No Culture) - Urine, Clean Catch; Future  -     Vortioxetine HBr (Trintellix) 10 MG tablet; Take 10 mg by mouth Daily.  Dispense: 90 tablet; Refill: 1  -     Ambulatory Referral to Psychiatry    4. Neuropathy  Comments:  Stable on neurontin 600mg TID  Orders:  -     gabapentin (NEURONTIN) 600 MG tablet; Take 1 tablet by mouth 3 (Three) Times a Day.  Dispense: 90 tablet; Refill: 3  -     TSH Rfx On Abnormal To Free T4; Future  -     Vitamin B12 & Folate; Future    5. Chemotherapy management, encounter for  -     gabapentin (NEURONTIN) 600 MG tablet; Take 1 tablet by mouth 3 (Three) Times a Day.  Dispense: 90 tablet; Refill: 3    6. Malignant neoplasm of female breast, unspecified estrogen receptor status, unspecified laterality, unspecified site of breast (HCC)  -     CBC & Differential; Future  -     Comprehensive Metabolic Panel; Future  -     Lipid Panel;  Future    7. Gastroesophageal reflux disease with esophagitis without hemorrhage  -     Lipid Panel; Future  -     TSH Rfx On Abnormal To Free T4; Future    8. Vitamin D deficiency  -     Vitamin D 25 Hydroxy; Future    9. Attention deficit  -     Ambulatory Referral to Psychiatry    10. Class 1 obesity due to excess calories with serious comorbidity and body mass index (BMI) of 30.0 to 30.9 in adult  Comments:  Disc diet and exercise program       Follow Up     Pt refused seeing nutritonist, dietician, bariatrics.    I have told pt on numerous occ that I will not manage her ADD - she will need psych or a different PCP.    Return in about 4 months (around 5/25/2022).    Patient was given instructions and counseling regarding her condition or for health maintenance advice. Please see specific information pulled into the AVS if appropriate.     I have reviewed information obtained and documented by others and I have confirmed the accuracy of this documented note.    SHEREE Galvin

## 2022-03-04 ENCOUNTER — OFFICE VISIT (OUTPATIENT)
Dept: FAMILY MEDICINE CLINIC | Facility: CLINIC | Age: 52
End: 2022-03-04

## 2022-03-04 VITALS
SYSTOLIC BLOOD PRESSURE: 118 MMHG | OXYGEN SATURATION: 97 % | HEART RATE: 78 BPM | DIASTOLIC BLOOD PRESSURE: 63 MMHG | WEIGHT: 171 LBS | HEIGHT: 63 IN | BODY MASS INDEX: 30.3 KG/M2

## 2022-03-04 DIAGNOSIS — F45.8 BRUXISM: Primary | ICD-10-CM

## 2022-03-04 DIAGNOSIS — E66.09 CLASS 1 OBESITY DUE TO EXCESS CALORIES WITH SERIOUS COMORBIDITY AND BODY MASS INDEX (BMI) OF 30.0 TO 30.9 IN ADULT: Chronic | ICD-10-CM

## 2022-03-04 PROCEDURE — 99213 OFFICE O/P EST LOW 20 MIN: CPT | Performed by: PHYSICIAN ASSISTANT

## 2022-03-04 NOTE — PROGRESS NOTES
Chief Complaint  Jaw Pain    Subjective          Idalia Drew presents to [facility]  History of Present Illness  Idalia Drew is a 51 y.o. female who presents today for jaw pain.     Pt stated her teeth are very sensitive, she has cracked several teeth due to clenching her jaw. Her dentist recommended contacting us to see what her options are, she does not want to pay through dental insurance to fix cracked teeth.     Pt states that she cant afford to get her teeth fixed, her dentist recommended that she find a maxillo-facial oral surgeon     Past Medical History:   Diagnosis Date   • Acid reflux    • Anxiety    • Anxiety disorder 2021   • Arthritis    • Asthma    • Bladder disorder    • Breast cancer (HCC)    • Broken bones    • Cancer (HCC)    • Chemotherapy management, encounter for    • Diverticulitis    • Forgetfulness    • Gall stones    • GERD (gastroesophageal reflux disease)    • Heart murmur    • Limb pain    • Limb swelling    • Neuropathy associated with cancer (HCC) 2021   • Night sweats    • Psoriasis    • Seasonal allergies    • Shortness of breath       Family History   Problem Relation Age of Onset   • Stroke Mother    • Osteoporosis Mother    • Arthritis Mother    • Alzheimer's disease Father    • Heart disease Father    • Lymphoma Father    • Arthritis Father    • Osteoporosis Sister    • Arthritis Sister    • Lung cancer Other    • Breast cancer Other         30s   • Diabetes Other       Past Surgical History:   Procedure Laterality Date   • BREAST SURGERY     •  SECTION      ,    • COLONOSCOPY     • GALLBLADDER SURGERY     • MASTECTOMY Bilateral    • PORTACATH PLACEMENT          Current Outpatient Medications:   •  clonazePAM (KlonoPIN) 0.5 MG tablet, Take 1 tablet by mouth 2 (Two) Times a Day As Needed for Anxiety., Disp: 60 tablet, Rfl: 0  •  cyanocobalamin 1000 MCG/ML injection, Inject 1 mL into the appropriate muscle  "as directed by prescriber Every 28 (Twenty-Eight) Days., Disp: 3 mL, Rfl: 3  •  gabapentin (NEURONTIN) 600 MG tablet, Take 1 tablet by mouth 3 (Three) Times a Day., Disp: 90 tablet, Rfl: 3    Current Facility-Administered Medications:   •  cyanocobalamin injection 1,000 mcg, 1,000 mcg, Intramuscular, Q28 Days, Ivan Pete PA    Objective     Vital Signs:     /63 (BP Location: Left arm)   Pulse 78   Ht 160 cm (63\")   Wt 77.6 kg (171 lb)   SpO2 97%   BMI 30.29 kg/m²    Estimated body mass index is 30.29 kg/m² as calculated from the following:    Height as of this encounter: 160 cm (63\").    Weight as of this encounter: 77.6 kg (171 lb).     Wt Readings from Last 3 Encounters:   03/04/22 77.6 kg (171 lb)   01/25/22 78.5 kg (173 lb)   10/18/21 75.8 kg (167 lb)     BP Readings from Last 3 Encounters:   03/04/22 118/63   01/25/22 133/71   10/18/21 120/63     Physical Exam  Vitals and nursing note reviewed.   Constitutional:       Appearance: Normal appearance.   HENT:      Head: Normocephalic and atraumatic.   Cardiovascular:      Rate and Rhythm: Normal rate and regular rhythm.   Pulmonary:      Effort: Pulmonary effort is normal.      Breath sounds: Normal breath sounds.   Musculoskeletal:      Cervical back: Neck supple.   Neurological:      Mental Status: She is alert.   Psychiatric:         Mood and Affect: Mood normal.         Behavior: Behavior normal.        Result Review :                Bone Scan and CT Scan - being done in [facility]       Assessment and Plan      Diagnoses and all orders for this visit:    1. Bruxism (Primary)  -     Ambulatory Referral to Oral Maxillofacial Surgery    2. Class 1 obesity due to excess calories with serious comorbidity and body mass index (BMI) of 30.0 to 30.9 in adult  Comments:  Disc diet and exercise       Follow Up     Return if symptoms worsen or fail to improve.    Patient was given instructions and counseling regarding her condition or for health " maintenance advice. Please see specific information pulled into the AVS if appropriate.     I have reviewed information obtained and documented by others and I have confirmed the accuracy of this documented note.    SHEREE Galvin

## 2022-03-08 ENCOUNTER — TELEPHONE (OUTPATIENT)
Dept: FAMILY MEDICINE CLINIC | Facility: CLINIC | Age: 52
End: 2022-03-08

## 2022-03-22 ENCOUNTER — TELEPHONE (OUTPATIENT)
Dept: FAMILY MEDICINE CLINIC | Facility: CLINIC | Age: 52
End: 2022-03-22

## 2022-03-29 ENCOUNTER — TELEPHONE (OUTPATIENT)
Dept: FAMILY MEDICINE CLINIC | Facility: CLINIC | Age: 52
End: 2022-03-29

## 2022-04-05 ENCOUNTER — TELEPHONE (OUTPATIENT)
Dept: FAMILY MEDICINE CLINIC | Facility: CLINIC | Age: 52
End: 2022-04-05

## 2022-04-20 ENCOUNTER — TELEPHONE (OUTPATIENT)
Dept: FAMILY MEDICINE CLINIC | Facility: CLINIC | Age: 52
End: 2022-04-20

## 2022-06-03 ENCOUNTER — OFFICE VISIT (OUTPATIENT)
Dept: FAMILY MEDICINE CLINIC | Facility: CLINIC | Age: 52
End: 2022-06-03

## 2022-06-03 VITALS
WEIGHT: 171 LBS | HEART RATE: 83 BPM | BODY MASS INDEX: 29.19 KG/M2 | HEIGHT: 64 IN | OXYGEN SATURATION: 99 % | DIASTOLIC BLOOD PRESSURE: 66 MMHG | SYSTOLIC BLOOD PRESSURE: 137 MMHG

## 2022-06-03 DIAGNOSIS — F41.9 ANXIETY DISORDER, UNSPECIFIED TYPE: Chronic | ICD-10-CM

## 2022-06-03 DIAGNOSIS — E53.8 VITAMIN B12 DEFICIENCY: Chronic | ICD-10-CM

## 2022-06-03 DIAGNOSIS — F32.A DEPRESSION, UNSPECIFIED DEPRESSION TYPE: ICD-10-CM

## 2022-06-03 DIAGNOSIS — K21.00 GASTROESOPHAGEAL REFLUX DISEASE WITH ESOPHAGITIS WITHOUT HEMORRHAGE: Primary | Chronic | ICD-10-CM

## 2022-06-03 DIAGNOSIS — C50.911 MALIGNANT NEOPLASM OF RIGHT FEMALE BREAST, UNSPECIFIED ESTROGEN RECEPTOR STATUS, UNSPECIFIED SITE OF BREAST: Chronic | ICD-10-CM

## 2022-06-03 PROCEDURE — 99214 OFFICE O/P EST MOD 30 MIN: CPT | Performed by: PHYSICIAN ASSISTANT

## 2022-06-03 RX ORDER — CYANOCOBALAMIN 1000 UG/ML
1000 INJECTION, SOLUTION INTRAMUSCULAR; SUBCUTANEOUS
Qty: 3 ML | Refills: 3 | Status: SHIPPED | OUTPATIENT
Start: 2022-06-03

## 2022-06-03 RX ORDER — LANSOPRAZOLE 30 MG/1
30 CAPSULE, DELAYED RELEASE ORAL DAILY
Qty: 30 CAPSULE | Refills: 2 | Status: SHIPPED | OUTPATIENT
Start: 2022-06-03 | End: 2022-09-29 | Stop reason: SDUPTHER

## 2022-06-03 RX ORDER — CLONAZEPAM 0.5 MG/1
0.5 TABLET ORAL 2 TIMES DAILY PRN
Qty: 60 TABLET | Refills: 0 | Status: SHIPPED | OUTPATIENT
Start: 2022-06-03 | End: 2022-10-07 | Stop reason: SDUPTHER

## 2022-06-03 NOTE — PROGRESS NOTES
Chief Complaint  Annual Exam (physical)    Subjective          Idalia Drew presents to [facility]  History of Present Illness     Pt presents today for annual physical.    Pt states she would like to discuss getting a script of prevacid.     Pt states she has been having heartburn after eating or after taking gabapentin.    Pt states is is mainly under rt breast.    Pt states she sees  1xyear.    Pt states she has had labs w/, will request a copy.    Pt had labs with [facility]    CT Chest/Abd/Pelvis - [facility]    Past Medical History:   Diagnosis Date   • Acid reflux    • Anxiety    • Anxiety disorder 2021   • Arthritis    • Asthma    • Bladder disorder    • Breast cancer (HCC)    • Broken bones    • Cancer (HCC)    • Chemotherapy management, encounter for    • Diverticulitis    • Forgetfulness    • Gall stones    • GERD (gastroesophageal reflux disease)    • Heart murmur    • Limb pain    • Limb swelling    • Neuropathy associated with cancer (HCC) 2021   • Night sweats    • Psoriasis    • Seasonal allergies    • Shortness of breath       Family History   Problem Relation Age of Onset   • Stroke Mother    • Osteoporosis Mother    • Arthritis Mother    • Alzheimer's disease Father    • Heart disease Father    • Lymphoma Father    • Arthritis Father    • Osteoporosis Sister    • Arthritis Sister    • Lung cancer Other    • Breast cancer Other         30s   • Diabetes Other       Past Surgical History:   Procedure Laterality Date   • BREAST SURGERY     •  SECTION      ,    • COLONOSCOPY     • GALLBLADDER SURGERY     • MASTECTOMY Bilateral    • PORTACATH PLACEMENT          Current Outpatient Medications:   •  clonazePAM (KlonoPIN) 0.5 MG tablet, Take 1 tablet by mouth 2 (Two) Times a Day As Needed for Anxiety., Disp: 60 tablet, Rfl: 0  •  cyanocobalamin 1000 MCG/ML injection, Inject 1 mL into the appropriate  "muscle as directed by prescriber Every 28 (Twenty-Eight) Days., Disp: 3 mL, Rfl: 3  •  gabapentin (NEURONTIN) 600 MG tablet, Take 1 tablet by mouth 3 (Three) Times a Day., Disp: 90 tablet, Rfl: 3  •  lansoprazole (Prevacid) 30 MG capsule, Take 1 capsule by mouth Daily., Disp: 30 capsule, Rfl: 2    Current Facility-Administered Medications:   •  cyanocobalamin injection 1,000 mcg, 1,000 mcg, Intramuscular, Q28 Days, Ivan Pete PA    Objective     Vital Signs:     /66 (BP Location: Right arm)   Pulse 83   Ht 162.6 cm (64\")   Wt 77.6 kg (171 lb)   SpO2 99%   BMI 29.35 kg/m²    Estimated body mass index is 29.35 kg/m² as calculated from the following:    Height as of this encounter: 162.6 cm (64\").    Weight as of this encounter: 77.6 kg (171 lb).     Wt Readings from Last 3 Encounters:   06/03/22 77.6 kg (171 lb)   03/04/22 77.6 kg (171 lb)   01/25/22 78.5 kg (173 lb)     BP Readings from Last 3 Encounters:   06/03/22 137/66   03/04/22 118/63   01/25/22 133/71       Physical Exam  Vitals and nursing note reviewed.   Constitutional:       Appearance: Normal appearance.   HENT:      Head: Normocephalic and atraumatic.   Cardiovascular:      Rate and Rhythm: Normal rate and regular rhythm.      Heart sounds: Normal heart sounds.   Pulmonary:      Effort: Pulmonary effort is normal.      Breath sounds: Normal breath sounds.   Abdominal:      General: Abdomen is flat. Bowel sounds are normal.      Palpations: Abdomen is soft.      Tenderness: There is abdominal tenderness.      Comments: Epig tenderness   Musculoskeletal:      Cervical back: Neck supple.   Neurological:      Mental Status: She is alert.   Psychiatric:         Mood and Affect: Mood normal.         Behavior: Behavior normal.        Result Review :                  Patient Care Team:  Ivan Pete PA as PCP - General (Physician Assistant)         Assessment and Plan      Diagnoses and all orders for this visit:    1. Gastroesophageal reflux " disease with esophagitis without hemorrhage (Primary)  Comments:  Poor control - begin PPI  Overview:  Poor control - begin PPI    Orders:  -     lansoprazole (Prevacid) 30 MG capsule; Take 1 capsule by mouth Daily.  Dispense: 30 capsule; Refill: 2    2. Vitamin B12 deficiency  Comments:  Stable on B12 injections monthly  Orders:  -     cyanocobalamin 1000 MCG/ML injection; Inject 1 mL into the appropriate muscle as directed by prescriber Every 28 (Twenty-Eight) Days.  Dispense: 3 mL; Refill: 3    3. Malignant neoplasm of right female breast, unspecified estrogen receptor status, unspecified site of breast (HCC)  Comments:  In remission - seeing Onc      Follow Up     Return in about 4 months (around 10/3/2022).    Patient was given instructions and counseling regarding her condition or for health maintenance advice. Please see specific information pulled into the AVS if appropriate.     I have reviewed information obtained and documented by others and I have confirmed the accuracy of this documented note.    SHEREE Galvin

## 2022-06-16 DIAGNOSIS — Z51.11 CHEMOTHERAPY MANAGEMENT, ENCOUNTER FOR: ICD-10-CM

## 2022-06-16 DIAGNOSIS — G62.9 NEUROPATHY: Chronic | ICD-10-CM

## 2022-06-17 RX ORDER — GABAPENTIN 600 MG/1
TABLET ORAL
Qty: 90 TABLET | Refills: 2 | Status: SHIPPED | OUTPATIENT
Start: 2022-06-17 | End: 2022-10-11

## 2022-07-07 ENCOUNTER — TELEPHONE (OUTPATIENT)
Dept: FAMILY MEDICINE CLINIC | Facility: CLINIC | Age: 52
End: 2022-07-07

## 2022-07-07 NOTE — TELEPHONE ENCOUNTER
Left message for patient to return call to the office regarding her labs that need to be done. Stated that she will have to fast for these labs to be done.

## 2022-09-01 DIAGNOSIS — F32.A DEPRESSION, UNSPECIFIED DEPRESSION TYPE: ICD-10-CM

## 2022-09-01 DIAGNOSIS — F41.9 ANXIETY DISORDER, UNSPECIFIED TYPE: Chronic | ICD-10-CM

## 2022-09-01 RX ORDER — CLONAZEPAM 0.5 MG/1
TABLET ORAL
Qty: 60 TABLET | Refills: 0 | OUTPATIENT
Start: 2022-09-01

## 2022-09-29 ENCOUNTER — TELEPHONE (OUTPATIENT)
Dept: FAMILY MEDICINE CLINIC | Facility: CLINIC | Age: 52
End: 2022-09-29

## 2022-09-29 DIAGNOSIS — F41.9 ANXIETY DISORDER, UNSPECIFIED TYPE: Chronic | ICD-10-CM

## 2022-09-29 DIAGNOSIS — K21.00 GASTROESOPHAGEAL REFLUX DISEASE WITH ESOPHAGITIS WITHOUT HEMORRHAGE: Chronic | ICD-10-CM

## 2022-09-29 DIAGNOSIS — Z79.899 LONG TERM USE OF DRUG: Primary | ICD-10-CM

## 2022-09-29 DIAGNOSIS — G62.9 NEUROPATHY: Chronic | ICD-10-CM

## 2022-09-29 DIAGNOSIS — F32.A DEPRESSION, UNSPECIFIED DEPRESSION TYPE: ICD-10-CM

## 2022-09-29 DIAGNOSIS — Z51.11 CHEMOTHERAPY MANAGEMENT, ENCOUNTER FOR: ICD-10-CM

## 2022-09-29 RX ORDER — LANSOPRAZOLE 30 MG/1
CAPSULE, DELAYED RELEASE ORAL
Qty: 30 CAPSULE | Refills: 1 | OUTPATIENT
Start: 2022-09-29

## 2022-09-29 RX ORDER — CLONAZEPAM 0.5 MG/1
TABLET ORAL
Qty: 60 TABLET | Refills: 0 | OUTPATIENT
Start: 2022-09-29

## 2022-09-29 RX ORDER — GABAPENTIN 600 MG/1
TABLET ORAL
Qty: 90 TABLET | Refills: 1 | OUTPATIENT
Start: 2022-09-29

## 2022-09-29 RX ORDER — CLONAZEPAM 0.5 MG/1
0.5 TABLET ORAL 2 TIMES DAILY PRN
Qty: 60 TABLET | Refills: 0 | Status: CANCELLED | OUTPATIENT
Start: 2022-09-29

## 2022-09-29 RX ORDER — LANSOPRAZOLE 30 MG/1
30 CAPSULE, DELAYED RELEASE ORAL DAILY
Qty: 30 CAPSULE | Refills: 2 | Status: SHIPPED | OUTPATIENT
Start: 2022-09-29

## 2022-09-29 RX ORDER — GABAPENTIN 600 MG/1
600 TABLET ORAL 3 TIMES DAILY
Qty: 90 TABLET | Refills: 2 | Status: CANCELLED | OUTPATIENT
Start: 2022-09-29

## 2022-09-29 NOTE — TELEPHONE ENCOUNTER
Incoming Refill Request      Medication requested (name and dose):   clonazePAM (KlonoPIN) 0.5 MG tablet  gabapentin (NEURONTIN) 600 MG tablet  lansoprazole (Prevacid) 30 MG capsule  Pharmacy where request should be sent:   [facility]    Additional details provided by patient:     Best call back number:   [phone]    Does the patient have less than a 3 day supply:  [x] Yes  [] No    Hipolito May Rep  09/29/22, 12:48 EDT

## 2022-10-03 ENCOUNTER — LAB (OUTPATIENT)
Dept: LAB | Facility: HOSPITAL | Age: 52
End: 2022-10-03

## 2022-10-03 DIAGNOSIS — Z79.899 LONG TERM USE OF DRUG: ICD-10-CM

## 2022-10-03 LAB
AMPHET+METHAMPHET UR QL: NEGATIVE
BARBITURATES UR QL SCN: NEGATIVE
BENZODIAZ UR QL SCN: NEGATIVE
CANNABINOIDS SERPL QL: NEGATIVE
COCAINE UR QL: NEGATIVE
METHADONE UR QL SCN: NEGATIVE
OPIATES UR QL: NEGATIVE
OXYCODONE UR QL SCN: NEGATIVE

## 2022-10-03 PROCEDURE — 80307 DRUG TEST PRSMV CHEM ANLYZR: CPT

## 2022-10-07 DIAGNOSIS — F32.A DEPRESSION, UNSPECIFIED DEPRESSION TYPE: ICD-10-CM

## 2022-10-07 DIAGNOSIS — F41.9 ANXIETY DISORDER, UNSPECIFIED TYPE: Chronic | ICD-10-CM

## 2022-10-07 RX ORDER — CLONAZEPAM 0.5 MG/1
0.5 TABLET ORAL 2 TIMES DAILY PRN
Qty: 60 TABLET | Refills: 0 | Status: SHIPPED | OUTPATIENT
Start: 2022-10-07

## 2022-10-07 NOTE — TELEPHONE ENCOUNTER
Pt called requesting refills and is currently out. UDS completed at the lab on 10/03/22    LRF Gabapentin 06/17/22  LRF Klonopin 06/03/22     Farzad 06/17/22  CC 06/03/22  OV 06/03/22  F/U 11/01/22 -RYAN

## 2022-10-11 DIAGNOSIS — Z51.11 CHEMOTHERAPY MANAGEMENT, ENCOUNTER FOR: ICD-10-CM

## 2022-10-11 DIAGNOSIS — G62.9 NEUROPATHY: Chronic | ICD-10-CM

## 2022-10-11 RX ORDER — GABAPENTIN 600 MG/1
TABLET ORAL
Qty: 90 TABLET | Refills: 1 | Status: SHIPPED | OUTPATIENT
Start: 2022-10-11

## 2022-10-11 NOTE — TELEPHONE ENCOUNTER
Pt called again about refill for gabapentin - pt stated it was first requested 09/29/2022 and she has completed the UDS and rx has still not been filled.

## 2023-01-25 ENCOUNTER — OFFICE VISIT (OUTPATIENT)
Dept: OTOLARYNGOLOGY | Facility: CLINIC | Age: 53
End: 2023-01-25
Payer: COMMERCIAL

## 2023-01-25 VITALS — TEMPERATURE: 98.7 F | HEIGHT: 64 IN | RESPIRATION RATE: 16 BRPM | BODY MASS INDEX: 30.05 KG/M2 | WEIGHT: 176 LBS

## 2023-01-25 DIAGNOSIS — K11.9 LESION OF PAROTID GLAND: Primary | ICD-10-CM

## 2023-01-25 DIAGNOSIS — M26.621 ARTHRALGIA OF RIGHT TEMPOROMANDIBULAR JOINT: ICD-10-CM

## 2023-01-25 DIAGNOSIS — R59.1 LYMPHADENOPATHY: ICD-10-CM

## 2023-01-25 PROCEDURE — 99204 OFFICE O/P NEW MOD 45 MIN: CPT | Performed by: OTOLARYNGOLOGY

## 2023-01-25 NOTE — PROGRESS NOTES
Patient Name: Idalia Drew   Visit Date: 01/25/2023   Patient ID: [id number]  Provider: Taiwo Ceballos MD    Sex: female  [place] [facility]   YOB: 1970  Location Address: [address] [place] [address], [address],?[place]?[id number]    Primary Care Provider Ivan Pete PA  Location Phone: [phone]    Referring Provider: No ref. provider found        Chief Complaint  Parotid mass (New patient )    Subjective    History of Present Illness  Idalia Drew is a 52 y.o. female who presents to [facility] today as a consult from No ref. provider found.    She presents to the clinic today for evaluation of discomfort around the right ear that has been present since the end of November, as well as cervical lymphadenopathy.  The patient has a complicated medical history, and did have breast cancer treated about 15 years ago with chemoradiation therapy on the right side.  She notes that overall she has been doing well and her weight has been stable.  She noted some discomfort around the right ear in November.  She informs me that she does have some odontogenic disease and has been told by her dentist that there teeth on the right side that likely need to be treated.  She denies any acute tooth pain.  She informs me that she has been told she has signs of teeth grinding and clenching and has even broken teeth because of grinding and clenching.  She denies any current pain with chewing.  She was able to feel a small lymph node on the right side of her neck and became very concerned due to her history of malignancy.  CT scan was performed on January 12, and I personally reviewed the imaging and the radiologist report.  There is a nonspecific right parotid lesion that measures 12 x 7 mm.  There are also some unusual appearing right cervical lymph nodes as well as lymph nodes in the preauricular area that are somewhat rounded but minimally enhancing.  The  radiologist noted that the parotid lesion may be either benign or malignant, but he was not able to differentiate anything further on imaging alone.  The lymph nodes visualized are more numerous on the right side and although they do not meet threshold for lymphadenopathy by size criteria, the findings do appear suspicious, although reactive lymphadenopathy could have an identical appearance.    Past Medical History:   Diagnosis Date   • Acid reflux    • Anxiety    • Anxiety disorder 2021   • Arthritis    • Asthma    • Bladder disorder    • Breast cancer (HCC)    • Broken bones    • Cancer (HCC)    • Chemotherapy management, encounter for    • Diverticulitis    • Forgetfulness    • Gall stones    • GERD (gastroesophageal reflux disease)    • Heart murmur    • Limb pain    • Limb swelling    • Neuropathy associated with cancer (HCC) 2021   • Night sweats    • Psoriasis    • Seasonal allergies    • Shortness of breath        Past Surgical History:   Procedure Laterality Date   • BREAST SURGERY     •  SECTION      ,    • COLONOSCOPY     • GALLBLADDER SURGERY     • MASTECTOMY Bilateral    • PORTACATH PLACEMENT           Current Outpatient Medications:   •  clonazePAM (KlonoPIN) 0.5 MG tablet, Take 1 tablet by mouth 2 (Two) Times a Day As Needed for Anxiety., Disp: 60 tablet, Rfl: 0  •  gabapentin (NEURONTIN) 600 MG tablet, TAKE 1 TABLET BY MOUTH THREE TIMES DAILY FOR PAIN (MAY CAUSE DROWSINESS), Disp: 90 tablet, Rfl: 1  •  lansoprazole (Prevacid) 30 MG capsule, Take 1 capsule by mouth Daily., Disp: 30 capsule, Rfl: 2  •  cyanocobalamin 1000 MCG/ML injection, Inject 1 mL into the appropriate muscle as directed by prescriber Every 28 (Twenty-Eight) Days., Disp: 3 mL, Rfl: 3    Current Facility-Administered Medications:   •  cyanocobalamin injection 1,000 mcg, 1,000 mcg, Intramuscular, Q28 Days, Ivan Pete PA     Allergies   Allergen Reactions   • Morphine Unknown - Low Severity   •  "Sulfamethoxazole-Trimethoprim Unknown - Low Severity   • Latex Rash   • Sulfa Antibiotics Itching       Family History   Problem Relation Age of Onset   • Stroke Mother    • Osteoporosis Mother    • Arthritis Mother    • Alzheimer's disease Father    • Heart disease Father    • Lymphoma Father    • Arthritis Father    • Osteoporosis Sister    • Arthritis Sister    • Lung cancer Other    • Breast cancer Other         30s   • Diabetes Other         Social History     Social History Narrative   • Not on file       Objective     Vital Signs:   Temp 98.7 °F (37.1 °C) (Temporal)   Resp 16   Ht 162.6 cm (64\")   Wt 79.8 kg (176 lb)   BMI 30.21 kg/m²       Physical Exam         Constitutional   Appearance  · : well developed, well-nourished, alert and in no acute distress, voice clear and strong    Head  Inspection  · : no deformities or lesions  Face  Inspection  · : No facial lesions; House-Brackmann I/VI bilaterally  Palpation  · : No TMJ crepitus nor  muscle tenderness bilaterally    Eyes  Vision  Visual Fields  · : Extraocular movements are intact. No spontaneous or gaze-induced nystagmus.  Conjunctivae  · : clear  Sclerae  · : clear  Pupils and Irises  · : pupils equal, round, and reactive to light.     Ears, Nose, Mouth and Throat    Ears    External Ears  · : appearance within normal limits, no lesions present  Otoscopic Examination  · : Tympanic membrane appearance within normal limits bilaterally without perforations, well-aerated middle ears  Hearing  · : intact to conversational voice both ears  Tunning fork testing:     :    Nose    External Nose  · : appearance normal  Intranasal Exam  · : mucosa within normal limits, vestibules normal, no intranasal lesions present, septum midline, sinuses non tender to percussion  Oral Cavity    Oral Mucosa  · : oral mucosa normal without pallor or cyanosis  Lips  · : lip appearance normal  Teeth  · : normal dentition for age  Gums  · : gums pink, non-swollen, " no bleeding present  Tongue  · : tongue appearance normal; normal mobility  Palate  · : hard palate normal, soft palate appearance normal with symmetric mobility    Throat    Oropharynx  · : no inflammation or lesions present, tonsils within normal limits  Hypopharynx  · : appearance within normal limits, superior epiglottis within normal limits  Larynx  · : appearance within normal limits, vocal cords within normal limits, no lesions present    Neck  Inspection/Palpation  · : normal appearance, no masses or tenderness, parotid glands soft, nontender, unable to feel distinct lesions, several small lymph nodes were palpated but they are readily mobile and about 6 mm in size, trachea midline; thyroid size normal, nontender, no nodules or masses present on palpation    Respiratory  Respiratory Effort  · : breathing unlabored  Inspection of Chest  · : normal appearance, no retractions    Cardiovascular  Heart  · : regular rate and rhythm    Lymphatic  Neck  · : no lymphadenopathy present  Supraclavicular Nodes  · : no lymphadenopathy present  Preauricular Nodes  · : no lymphadenopathy present    Skin and Subcutaneous Tissue  General Inspection  · : Regarding face and neck - there are no rashes present, no lesions present, and no areas of discoloration    Neurologic  Cranial Nerves  · : cranial nerves II-XII are grossly intact bilaterally  Gait and Station  · : normal gait, able to stand without diffculty    Psychiatric  Judgement and Insight  · : judgment and insight intact  Mood and Affect  · : mood normal, affect appropriate          Assessment and Plan    Diagnoses and all orders for this visit:    1. Lesion of parotid gland (Primary)    2. Lymphadenopathy    3. Arthralgia of right temporomandibular joint    Evaluation today included reviewing her imaging along with her and her , and I gave them my interpretation as described above.  We read through the radiologist report.  Exam today was essentially normal.   She does have several small lymph nodes but they are scant, readily mobile, nontender, and have no overlying skin changes.  We discussed the parotid lesion and spoke about etiologies including intraparotid lymph node, parotid mass which could be either benign or malignant, and we did discuss the nature of lymphadenopathy with possibilities ranging from reactive lymphadenopathy to malignancy.  I gave her several options including observation with close follow-up, repeat CT scan, PET scan to assess for any hypermetabolism, and biopsies of the parotid mass or lymph nodes.  Clinically she seems to be doing very well, but given her cancer history she is very anxious about it.  She does have some TMJ symptoms, and I recommended some treatment including massaging the temporalis and masseter muscles, jaw stretching, warm compresses, and maintaining a soft diet.  I would also like for her to discuss this further with her oncologist.  She would like to think about how to proceed and will contact me by Friday.    Follow Up   No follow-ups on file.  Patient was given instructions and counseling regarding her condition or for health maintenance advice. Please see specific information pulled into the AVS if appropriate.

## 2023-02-08 ENCOUNTER — TELEPHONE (OUTPATIENT)
Dept: OTOLARYNGOLOGY | Facility: CLINIC | Age: 53
End: 2023-02-08
Payer: COMMERCIAL

## 2023-02-08 NOTE — TELEPHONE ENCOUNTER
Spoke to patient and let her know the insurance company denied her PET scan order. I let her know I spoke with Dr Ceballos who said to make the patient a 2 to 3 month follow up appointment for close observation. Patient stated she was going to call her oncologist about them possibly ordering her a PET scan. Patient did inquire about maybe getting a repeat CT scan if she is not able to get PET scan and I told her to call the office back after she talks to her oncologist about the PET scan to let us know how to proceed. Patient stated that she understands.

## 2023-02-22 ENCOUNTER — TELEPHONE (OUTPATIENT)
Dept: OTOLARYNGOLOGY | Facility: CLINIC | Age: 53
End: 2023-02-22
Payer: COMMERCIAL

## 2023-02-22 NOTE — TELEPHONE ENCOUNTER
I received a fax from [employer] stating the diagnosis Dr. Ceballos provided for the PET scan is not covered indications for PET/CT. Dr. Ceballos is aware the diagnosis is not covered. We will speak with Dr. Ceballos again about the order upon his return from vacation. I spoke with patient letting her know that we are closing this order due to [facility] not covering it. Patient was upset.

## 2023-03-22 ENCOUNTER — TELEPHONE (OUTPATIENT)
Dept: OTOLARYNGOLOGY | Facility: CLINIC | Age: 53
End: 2023-03-22
Payer: COMMERCIAL

## 2023-03-22 NOTE — TELEPHONE ENCOUNTER
Provider: DR ROBLEDO  Caller: KELBY BELLO  Relationship to Patient: SELF  Reason for Call: SAME DAY CANCEL - PT HAS DECIDED TO SEE ANOTHER PROVIDER.    PT NOT HAPPY WITH SERVICES RECEIVED AND HAS DECIDED TO SEE ANOTHER PROVIDER.  PLEASE MAKE SURE THERE ARE NOT ANY FURTHER COMMUNICATIONS FROM OFFICE.

## 2023-10-03 ENCOUNTER — TRANSCRIBE ORDERS (OUTPATIENT)
Dept: PHYSICAL THERAPY | Facility: CLINIC | Age: 53
End: 2023-10-03
Payer: COMMERCIAL

## 2023-10-03 DIAGNOSIS — M26.623 ARTHRALGIA OF BILATERAL TEMPOROMANDIBULAR JOINT: Primary | ICD-10-CM
